# Patient Record
(demographics unavailable — no encounter records)

---

## 2024-10-14 NOTE — PHYSICAL EXAM
[No Nipple Discharge] : no nipple discharge [de-identified] : cstic changes in lower outer quad, rest dense [de-identified] : weak cores [FreeTextEntry1] : defer [de-identified] : from in knee, neg draw, [place], no sweling. slight crepitus

## 2024-10-14 NOTE — HISTORY OF PRESENT ILLNESS
[de-identified] : 66 yo F, retired  with hypothyroidism, osteopenia, ADHD, LRRH2 mutation, here for her second AWV with me after her PCP left practice in 2022..  Presently  with her wife x 11 years. Had twins-boy and girl which she gave birth after insemination when she was with her first female partner who  over 20 year ago when she gave birth and raised her children by herself Following concerns: _ Needs flu vaccine UTD with mammo and DEXA-osteopenia -Covid infection in May 2023-took Paxlovid. Vaccines-FLU and Covid booster this past  Shingrix vaccine  x2 in  -Hypothyroid -diagnosed in , levothyroxine dose adjusted Oct 2021 when TSH 6.6, on levothyroxine 112mcgOD and on  day, 100mcg added added for total 212mcg.  (+ thyroid AB).  Labs reviewed with her and increase Free T4 noted since additional levothyroxine 100mcg added once/week with TSH in lower range of normal-Suggest stopping additional dose today and f/u labs in 6 weeks. Seen by endo Dr Meyer for  elevated TPO  and thyroglubulin Ab-note in chart .FH thyroid conditions in family Hx LRRH2 mutation-followed in study as father has Parkinson and to assess her own risk -Osteopenia-takes no Vit D or MVI or calcium. Last dexas recorded in chart  with osteopenia noted and in .  Aware to start Vit D 7287-4385 iu daily, as not on any Vit D, and has been more sedentary in past months and not doing any significant weight bering exercises. . Reviewed significance of osteopenia and therapy if progression to osteoporosis with use biphophonates. FH osteoporosis in mom. Takes no extra vitamins, no Vit D or calcium. More sedentary in past few monhts with weight gain and higher caloric foods. Also hypercalcemia noted in  and will obtain PTH level, Vit D levels, -chronic knee pain/pes equinus-wears custom orthotics-reports discomfort in her knees for several years, attributes to biking in past..  Did see ortho Dr Maxwell Oct 2016 but pain back again and needs f/u with ortho and orthotist as not using her orthotics now. -chronic LBP-admits to being more sedentary after [facility] 5 years ago as . Admits to weak cores, and not exercising recently, weight variable, made aware high arches contributing to symptoms-wants referal to see an orthopdist and get new orthoics -Hx ADHD-prn Vyvance-refill by outside provider and to continue same  Gyn UTD Mammogram UTD [facility] 2021 _

## 2024-10-14 NOTE — PHYSICAL EXAM
[No Nipple Discharge] : no nipple discharge [de-identified] : cstic changes in lower outer quad, rest dense [de-identified] : weak cores [FreeTextEntry1] : defer [de-identified] : from in knee, neg draw, [place], no sweling. slight crepitus

## 2024-10-14 NOTE — HISTORY OF PRESENT ILLNESS
[de-identified] : 68 yo F, retired  with hypothyroidism, osteopenia, ADHD, LRRH2 mutation, here for her second AWV with me after her PCP left practice in 2022..  Presently  with her wife x 11 years. Had twins-boy and girl which she gave birth after insemination when she was with her first female partner who  over 20 year ago when she gave birth and raised her children by herself Following concerns: _ Needs flu vaccine UTD with mammo and DEXA-osteopenia -Covid infection in May 2023-took Paxlovid. Vaccines-FLU and Covid booster this past  Shingrix vaccine  x2 in  -Hypothyroid -diagnosed in , levothyroxine dose adjusted Oct 2021 when TSH 6.6, on levothyroxine 112mcgOD and on  day, 100mcg added added for total 212mcg.  (+ thyroid AB).  Labs reviewed with her and increase Free T4 noted since additional levothyroxine 100mcg added once/week with TSH in lower range of normal-Suggest stopping additional dose today and f/u labs in 6 weeks. Seen by endo Dr Meyer for  elevated TPO  and thyroglubulin Ab-note in chart .FH thyroid conditions in family Hx LRRH2 mutation-followed in study as father has Parkinson and to assess her own risk -Osteopenia-takes no Vit D or MVI or calcium. Last dexas recorded in chart  with osteopenia noted and in .  Aware to start Vit D 0039-5738 iu daily, as not on any Vit D, and has been more sedentary in past months and not doing any significant weight bering exercises. . Reviewed significance of osteopenia and therapy if progression to osteoporosis with use biphophonates. FH osteoporosis in mom. Takes no extra vitamins, no Vit D or calcium. More sedentary in past few monhts with weight gain and higher caloric foods. Also hypercalcemia noted in  and will obtain PTH level, Vit D levels, -chronic knee pain/pes equinus-wears custom orthotics-reports discomfort in her knees for several years, attributes to biking in past..  Did see ortho Dr Maxwell Oct 2016 but pain back again and needs f/u with ortho and orthotist as not using her orthotics now. -chronic LBP-admits to being more sedentary after senior care 5 years ago as . Admits to weak cores, and not exercising recently, weight variable, made aware high arches contributing to symptoms-wants referal to see an orthopdist and get new orthoics -Hx ADHD-prn Vyvance-refill by outside provider and to continue same  Gyn UTD Mammogram UTD [place] 2021 _

## 2024-10-14 NOTE — HEALTH RISK ASSESSMENT
[Time Spent: ___ Minutes] : I spent [unfilled] minutes performing a depression screening for this patient. [de-identified] : 3 calderon/week [Audit-CScore] : 3 [[id number]] : 0

## 2024-10-14 NOTE — HEALTH RISK ASSESSMENT
[Time Spent: ___ Minutes] : I spent [unfilled] minutes performing a depression screening for this patient. [de-identified] : 3 calderon/week [Audit-CScore] : 3 [[id number]] : 0

## 2024-10-23 NOTE — HISTORY OF PRESENT ILLNESS
[de-identified] : 66 yo F with hearing loss AU for over 7 years now with clogged sensation AD since recent flight three weeks ago presents for evaluation. Developed clogged sensation AD after flying about three weeks ago - tried decongestant - slight improvement but returned. Now with muffled hearing AD. Hearing aids for past 5 years - doesnt wear consistently - helpful but old - has appointment with dispensary in Dec to get new ones. No tinnitus, otalgia, otorrhea, ear infections, dizziness/vertigo or headaches related to hearing loss. Hx of head trauma 10 years ago and another time as a child. No hx of exposure to loud noises. Mother with hearing loss in her 70s. Last audio over a year ago. Has hx of [place] Palsy 5 years ago left side - had MRI then - and memory issues - MRI 2022 ([facility]) -

## 2024-10-23 NOTE — DATA REVIEWED
[de-identified] : Mild to moderate SNHL AD. Hearing is -1kHz, mild to moderately severe SNHL thereafter AS. Type A (normal) tympanograms AU. ETD: negative AS, could not maintain seal AD.

## 2024-10-23 NOTE — HISTORY OF PRESENT ILLNESS
[de-identified] : 66 yo F with hearing loss AU for over 7 years now with clogged sensation AD since recent flight three weeks ago presents for evaluation. Developed clogged sensation AD after flying about three weeks ago - tried decongestant - slight improvement but returned. Now with muffled hearing AD. Hearing aids for past 5 years - doesnt wear consistently - helpful but old - has appointment with dispensary in Dec to get new ones. No tinnitus, otalgia, otorrhea, ear infections, dizziness/vertigo or headaches related to hearing loss. Hx of head trauma 10 years ago and another time as a child. No hx of exposure to loud noises. Mother with hearing loss in her 70s. Last audio over a year ago. Has hx of [place] Palsy 5 years ago left side - had MRI then - and memory issues - MRI 2022 ([facility]) -

## 2024-10-23 NOTE — DATA REVIEWED
[de-identified] : Mild to moderate SNHL AD. Hearing is -1kHz, mild to moderately severe SNHL thereafter AS. Type A (normal) tympanograms AU. ETD: negative AS, could not maintain seal AD.

## 2024-11-16 NOTE — HISTORY OF PRESENT ILLNESS
[FreeTextEntry1] : oab sx better/ self d/c'd the oxybutinin due to concerns re memory [Urge Incontinence Of Urine] : moderate [Urinary Frequency] : moderate [Feelings Of Urinary Urgency] : moderate [x1] : once nightly [Urinary Tract Infection] : mild [Cystocele (Obstetric)] : bladder prolapse [Uterine Prolapse] : uterine prolapse [Vaginal Wall Prolapse] : vaginal prolapse [Rectal Prolapse] : rectal prolapse [Constipation Obstructed Defecation] : constipation [Incomplete Emptying Of Stool] : incomplete defecation [Stool Visible Blood] : blood in the stool [Diarrhea] : diarrhea [Stress Incontinence] : stress incontinence [Unable To Restrain Bowel Movement] : fecal incontinence [Hematuria] : hematuria [Pain During Urination (Dysuria)] : dysuria [Incomplete Emptying Of Bladder] : incomplete empyting of bladder [Urinary Stream Starts And Stops] : intermittent urine stream [Pelvic Pain] : pelvic pain [Vaginal Pain] : vaginal pain [Vulvar Pain] : vulva pain [Bladder Pain] : bladder pain [Rectal Pain] : rectal pain [Back Pain] : flank/back pain [Sexual Dysfunction, NOS] : sexual arousal dysfunction [] : sexual climax dysfunction

## 2024-11-16 NOTE — PHYSICAL EXAM
[Chaperone Present] : A chaperone was present in the examining room during all aspects of the physical examination [No Acute Distress] : in no acute distress [Well developed] : well developed [Well Nourished] : ~L well nourished [Good Hygeine] : demonstrates good hygeine [Oriented x3] : oriented to person, place, and time [Normal Memory] : ~T memory was ~L unimpaired [Normal Lung Sounds] : the lungs were clear to auscultation [Normal Mood/Affect] : mood and affect are normal [Respirations regular] : ~T respiratory rate was regular [Rate & Rhythm Regular] : ~T heart rate and rhythm were normal [No Edema] : ~T edema was not present [Supple] : ~T the neck demonstrated no ~M decrease in suppleness [Thyroid Normal] : the thyroid ~T showed no abnormalities [Symmetrical] : the neck was ~L symmetrical [Mass] : no breast mass [Tender] : no tenderness [Nipple Discharge] : no nipple discharge [Mass (___ Cm)] : no ~M [unfilled] abdominal mass was palpated [Tenderness] : ~T no ~M abdominal tenderness observed [H/Smegaly] : no hepatosplenomegaly [Supraclavicular LAD] : no adenopathy noted in supraclavicular lymph nodes [Axillary LAD] : no adenopathy was noted in axillary nodes [Inguinal LAD] : no adenopathy was noted in the inguinal lymph nodes [Warm and Dry] : was warm and dry to touch [Turgor Normal] : skin turgor ~T was normal [Rash/Lesion] : no rash or lesion was noted [Vulvar Atrophy] : vulvar atrophy [Labia Majora] : were normal [Labia Minora] : were normal [Bartholin's Gland] : both Bartholin's glands were normal  [Normal Appearance] : general appearance was normal [Estrogen Effect] : no estrogen effect was observed [4] : 4 [] : 0 [Uterine Adnexae] : were not tender and not enlarged [Normal rectal exam] : was normal [Normal] : was normal [None] : no

## 2024-11-16 NOTE — REVIEW OF SYSTEMS
[Eyesight Problems] : eyesight problems [Easy Bruising] : a tendency for easy bruising [Loss Of Hearing] : hearing loss [All Other ROS] : all other reviewed systems are negative [FreeTextEntry1] : neuro/memory being monitored

## 2024-11-16 NOTE — COUNSELING
[FreeTextEntry1] : Update December 21, 2023  #1 mammogram/sono normal on rpt views 10/18 rpt 10/19 she had a BI-RADS 3 in December saw Dr. Wong no masses noted on exam or abnormalities today or with Dr. Wong and she will have a repeat mammo-sono in June prescription was given for diagnostic mammo-and sono normal ck 2021: MISSED JUNE MAMMO/SONO! ORDERED ASAP AND THEN TO SEE DR WONG-NO MASSES FELT ON EXAM HOWEVER THERE IS ON SONO/MAMMO STABLE CYST/NODULE 10MM AT 2-3O'CLOCK 4-5 CM FROM AREOLA-BARELY APPRECIATED- LEFT BREAST! discharged from Dr. Wong this year for now, recent m/s stable-normal exam 4/22/22; UPDATE 12/2/22-normal exam today-referred for mammo/sonogram (ck br cyst)-update-12/22-normal examination-normal mammo sono 2023- she will follow-up with Dr. Wong as clinically indicated and have repeat mammo sono done in February 2024 or sooner as clinically indicated-normal breast exam today  #2 NEURO WORKUP IN PROGRESS/REF TO DR TIJERINA AT PT REQ TO RE-CK THYOID 'NOT RIGHT YET' 4/22/22-janna wong neuro and [facility]ma psych ref by dr wong-all testing per pt completed and wnl. Pt not on her Vyvanze at this point-'not helping ex a little bit' if she needs to take this she will re-consult psych dr Freire. Thyroid wnl at this point-stable.(mild thyroiditis sono 2023)  #3 bone density Basically stable osteopenia-will increase vitamin D, and already exercises quite a bit-repeat 2 years-thyroid is currently under #normal-sees pcp for this-Dr. GUDINO-2023 slight> osteopenia hip, spine relatively stable-rpt 2025  #4 Pelvic ultrasound ordered transabdominal and transvaginal normal ex ovarian cyst slightly smaller to stable-simple with new or previously not visualized 8 mmfibroid in uterus, no symptoms of bleeding, pain, etc NORMAL STABLE 4MM AND 2X 3 CM SIMPLE CYST-2018; rpt as clinically indicated 6-12 mos-stable cyst simple cyst today unilocular however patient has some middle of gastric heaviness and although I do not feel anything on exam I am ordering a transabdominal pelvic and abdominal ultrasound to make sure that there is no intra-abdominal or upper pelvic lesion.  Her pelvic exam is completely normal with a stable cyst which is now slightly smaller than the last ultrasound at 26 mm it unilocular endometrial lining is normal this will be repeated in 1 year or sooner as clinically indicated as confirmed by pelvic exam today which is entirely normal-rpt 10/8/21-NORMAL EL AND STABLE UNILOCULAR CYST RPT 2022; APRIL 22 2022 TO CK OAB AND BREAST AND ANNUAL OCT WITH SONO/PAP ETC; ____________; pap/hpv 4/22/22, sono el 3 x 2 mm polyp-no vascularity, no bleeding, however 3.5 cm rt unilocular benign cyst sl > than last sono oct 2021 2.7cm-rpt 3-6 mos here and sooner is sx or bleeding-ssx ov cancer given to pt; UPDATE 12/2/22-polyp resolved and RO cyst now smaller 3.1 cm from 3.4 cm and is SIMPLE-rpt 12/23-ovaries NORMAL-NO CYSTS, el nl < 3mm ?avascular 5 mm polyp-NO stain/bleeding at all, repeat 2024 or as clinically indicated  #5 Pap and HPV nl  2020 and neg affirm at lv; no discharge today RPT 2022 OR AS CLINICALLY INDICATED; 4/22/22: pap/hpv repeated, affirm (no need for affirm/no discharge or sx); pap and hpv repeated NORMAL; UPDATE rpt as per asccp guidelines-due 2024 pap/hpv-nl exam NO discharge today  #6 slight hearing loss she will have this reevaluated next year and possibly have some bilateral small hearing aides placed this is in all likelihood a high-frequency loss consistent with age-Her mother ALT also has mild age-related high-frequency loss-Going for hearing aids due to high-frequency  hearing loss-has them and they are out for repair-ref to [facility] speech and hearing   #7 Pelvic ultrasound  12/18/24 w/visit annual 12/2024  #8 Patient will see me in 12 mos appt made or sooner as clinically indicated  #9 Patient was adequately treated for bacterial vaginosis And test of cure was negative now no discharge evident as above-affirm sent lv neg and no evidence of this today  #10 Colonoscopy is up to date 11/21 no polyps neg rpt 3 years per pt will ck w/GI MD due 2024   #11 Methanamine rxd 1 g following intercourse or sexual activity to ameliorate UTIs associated with this activity-no uti so is not taking this currently; cran prn w/sexualy activity  #12  questions answered to the patient's apparent satisfaction new lit provided  #13 slight uui if waits too long-timed voiding suggested-PTNS brochure given and acupressure points shown to pt -always has had oab since age 6 per pt-declines rx at this point but advised if > to lmk and we will try and rx  #14 ua uc cytol leslie was negative_ urinalysis culture and cytology sent today   #15  Patient concerned about vaginal and vulvar area no discharge seen today-whatsoever and the tiny area that she is concerned about is consistent with a very tiny less than 1 mm vulvar follicle with no evidence of induration erythema or infection patient was advised to continue vulvar hygiene with use of probiotic as needed: will take as needed-less of a concern today 12/21/23  #16 memory loss currently under investigation (see note from neurologist)-resolved all tests 'age appropriate'-aware and will follow humza

## 2024-11-16 NOTE — CHIEF COMPLAINT
[Questionnaire Received] : Patient questionnaire received [Urine Frequency] : urine frequency [Other ___] : [unfilled] [Poor Bladder Control] : poor bladder control

## 2024-11-18 NOTE — HISTORY OF PRESENT ILLNESS
[FreeTextEntry1] : Ms. JANE WEILER is a 68 year  old female  here for evaluation of hypothyroidism Diagnosed in her 20s.  She has been stable on thyroid medications 112mcg once daily.  For one day of the week, she takes 222mcg once daily.   She reported significant weight gain over the last few months. She endorsed maybe exercising less; she didn't noticed a significant change in her diet.  She did endorse decreased exercise.   Ms. WEILER  reports: There is not signficiant fatigue There was trouble losing weight There was no cold intolerance There  was no constipation She report some trouble falling asleep.   There is mild depression.  There is strong family history of thyroid issues, mother and sister with hypothyroidism.  She takes a cup of coffee and then take her thyroid medication.  She wait at least 30 minutes to one hour prior.   There is not a personal history of radiation exposure There was no exposure to amiodarone, recent iodine containing contrast  There is not exogenous thyroid intake, nutritional supplements.   Patient had a history of LRRK2 mutation, currently being FDL for cognitive and longitudinal testing.  She also reported a history of ADD since childhood.  Patient had a history of ADHD in the past, she was treated with Vyvanse in the past but currently not taking anything.  Patient is retired  at [employer].  She did mostly outpatient work. Patient establish care with Dr. Pal secondary to memory loss.  Patient is referred back to me by her primary care doctor regarding elevated calcium level and high parathyroid hormone level. Patient denies any family history of FHH. Patient reports no prior history of kidney stone. Patient has osteopenia as evident on most recent bone density in October 2023.

## 2024-11-18 NOTE — ASSESSMENT
[FreeTextEntry1] : 60-year-old woman here to follow-up for hypothyroidism and hypoparathyroidism, likely primary.   1.  Hashimoto thyroiditis, hypothyroidism Patient is currently on levothyroxine treatment, 112 mcg 6 days a week and 212 mcg on Sunday.   She is clinically euthyroid. She is biochemically euthyroid with a normal TSH, free T3, T3 total level.  Discussed with patient that Hashimoto encephalopathy is a syndrome of acute or subacute encephalopathy that is associated with elevated antithyroid antibody titers.  Currently I do not suspect that patient's presentation is consistent with encephalopathy.  I discussed with patient that the entity of Hashimoto encephalopathy may be disputed, given the autoimmune nature of the disease, high-dose of glucocorticoid have been used in the past and patient was suspected cases in hope of lowering the inflammatory process.  This is sometimes used when other causes of encephalopathy is ruled out.  2.  Hypercalcemia The physiology of calcium homeostasis was reviewed with the patient including regulation of calcium by parathyroid hormone and mechanisms by which PTH acts. In addition we discussed potential causes of hypercalcemia including primary hyperparathyroidism.Check 24 hour urine calcium, creatinine, and volume. Instructions for proper 24 hour urine collection were reviewed.  Check 24 hour urine excretion  Serum Vitamin D 25OH, Vitamin D 1,25(OH)Vitamin D Bone density was done Oct 2024-Kidney ultrasound to assess for undiagnosed (subclinical) nephrocalcinosis or calcium kidney stone.   Discussed indication for surgery in asymptomatic PHPT 2013: (Serum calcium 1.0mg/dl above the upper limit of normal, BMD by DXA: T-score <-2.5 at lumbar spine, total hip, femoral neck, or distal 1/3 radius, vertebral fracture by radiograph, CT, MRI, or VFA, CrCl <60mL/min, 24 hour urine >400 mg/dl and increase stone risk by biochemical stone risk analysis), presence of nephrolithiasis or nephrocalcinosis by radiograph, ultrasound, or CT) Patient is very interested in proceeding with surgery for definitive cure of hyperparathyroidism.

## 2024-12-04 NOTE — REASON FOR VISIT
[Home] : at home, [unfilled] , at the time of the visit. [Medical Office: ([facility])___] : at the medical office located in  [Spouse] : spouse [Patient] : the patient

## 2024-12-04 NOTE — HISTORY OF PRESENT ILLNESS
[FreeTextEntry1] : Ms. JANE WEILER is a 68 year  old female  here for evaluation of hypothyroidism Diagnosed in her 20s.  She has been stable on thyroid medications 112mcg once daily.  For one day of the week, she takes 222mcg once daily.   She reported significant weight gain over the last few months. She endorsed maybe exercising less; she didn't notice a significant change in her diet.  She did endorse decreased exercise.  Patient had a history of LRRK2 mutation, currently being FDL for cognitive and longitudinal testing.  She also reported a history of ADD since childhood.  Patient had a history of ADHD in the past, she was treated with Vyvanse in the past but currently not taking anything.  Patient is retired  at [employer].  She did mostly outpatient work. Patient establish care with Dr. Pal secondary to memory loss.  Patient is referred back to me by her primary care doctor regarding elevated calcium level and high parathyroid hormone level. Patient denies any family history of FHH. Patient reports no prior history of kidney stone. Patient has osteopenia as evident on most recent bone density in October 2023. She was interested in proceeding with parathyroidectomy.

## 2024-12-04 NOTE — ASSESSMENT
[FreeTextEntry1] : 60-year-old woman here to follow-up for hypothyroidism and hypoparathyroidism, likely primary.   1.  Hashimoto thyroiditis, hypothyroidism Patient is currently on levothyroxine treatment, 112 mcg 6 days a week and 212 mcg on Sunday.   She is clinically euthyroid. She is biochemically euthyroid with a normal TSH, free T3, T3 total level.  2.  Hypercalcemia Discussed indication for surgery in asymptomatic PHPT 2013: (Serum calcium 1.0mg/dl above the upper limit of normal, BMD by DXA: T-score <-2.5 at lumbar spine, total hip, femoral neck, or distal 1/3 radius, vertebral fracture by radiograph, CT, MRI, or VFA, CrCl <60mL/min, 24 hour urine >400 mg/dl and increase stone risk by biochemical stone risk analysis), presence of nephrolithiasis or nephrocalcinosis by radiograph, ultrasound, or CT) Patient is very interested in proceeding with surgery for definitive cure of hyperparathyroidism.   Patient meeting criteria as 24 hour urine calcium >400 mg/dl Patient with worsening osteopenia.  US kidney and bladder done and are within normal limits Patient scheduled for Head and Neck Surgery tomorrow.    Will follow up with me 3 months after surgery around April 2025.

## 2024-12-06 NOTE — HISTORY OF PRESENT ILLNESS
[de-identified] : Pt presents for evaluation of elevated calcium and fatigue. denies bone pain, constipation or kidney stones, dysphagia, hoarseness, SOB or RT exposure.  Ca 10.8, PTH 72 Vitamin D 96.6, 24 Hr Urine Ca 432, normal TFTs sonogram: no thyroid nodules or parathyroids identified bone density: osteopenia I have reviewed all old and new data and available images.  Additional information was obtained from others present at the time of visit to ensure the completeness of the history

## 2024-12-06 NOTE — ASSESSMENT
[FreeTextEntry1] : lengthy discussion regarding options for management. in view of labs and symptoms have recommended minimally invasive parathyroidectomy with PTH assay.  will require preop 4D CT.  risks, benefits and alternatives discussed at length.  I have discussed with the patient the anatomy of the area, the pathophysiology of the disease process and the rationale for surgery.  The attendant risks, possible complications and expected postoperative course have been discussed in detail.  I have given the patient the opportunity to ask questions, and all questions have been answered to the patient's satisfaction, and they wish to proceed with the planned procedure. to be scheduled ambulatory at [facility]. to call next week for CT results.

## 2024-12-06 NOTE — CONSULT LETTER
[Dear  ___] : Dear  [unfilled], [Consult Letter:] : I had the pleasure of evaluating your patient, [unfilled]. [Please see my note below.] : Please see my note below. [Consult Closing:] : Thank you very much for allowing me to participate in the care of this patient.  If you have any questions, please do not hesitate to contact me. [Sincerely,] : Sincerely, [FreeTextEntry2] : Dr. Yaniv Meyer, Dr. Erika Lange [FreeTextEntry3] : Sterling Alvarado MD, FACS System Director, Endocrine Surgery [facility] Associate  Professor of Surgery [employer] [facility] [employer] at [facility] [DrKyle  ___] : Dr. CUELLO

## 2024-12-06 NOTE — PHYSICAL EXAM
[de-identified] : no palpable thyroid nodules [Laryngoscopy Performed] : laryngoscopy was performed, see procedure section for findings [Midline] : located in midline position [Normal] : orientation to person, place, and time: normal [de-identified] : indirect  laryngoscopy shows normal vocal cord mobility bilaterally with no lesions noted

## 2024-12-06 NOTE — ASSESSMENT
[FreeTextEntry1] : lengthy discussion regarding options for management. in view of labs and symptoms have recommended minimally invasive parathyroidectomy with PTH assay.  will require preop 4D CT.  risks, benefits and alternatives discussed at length.  I have discussed with the patient the anatomy of the area, the pathophysiology of the disease process and the rationale for surgery.  The attendant risks, possible complications and expected postoperative course have been discussed in detail.  I have given the patient the opportunity to ask questions, and all questions have been answered to the patient's satisfaction, and they wish to proceed with the planned procedure. to be scheduled ambulatory at [facility]. to call next week for CT results.

## 2024-12-06 NOTE — CONSULT LETTER
[Dear  ___] : Dear  [unfilled], [Consult Letter:] : I had the pleasure of evaluating your patient, [unfilled]. [Please see my note below.] : Please see my note below. [Consult Closing:] : Thank you very much for allowing me to participate in the care of this patient.  If you have any questions, please do not hesitate to contact me. [Sincerely,] : Sincerely, [FreeTextEntry2] : Dr. Yaniv Meyer, Dr. Erika Lange [FreeTextEntry3] : Sterling Alvarado MD, FACS System Director, Endocrine Surgery [facility] Associate  Professor of Surgery [facility] [employer] at [facility] [DrKyle  ___] : Dr. CUELLO

## 2024-12-06 NOTE — HISTORY OF PRESENT ILLNESS
[de-identified] : Pt presents for evaluation of elevated calcium and fatigue. denies bone pain, constipation or kidney stones, dysphagia, hoarseness, SOB or RT exposure.  Ca 10.8, PTH 72 Vitamin D 96.6, 24 Hr Urine Ca 432, normal TFTs sonogram: no thyroid nodules or parathyroids identified bone density: osteopenia I have reviewed all old and new data and available images.  Additional information was obtained from others present at the time of visit to ensure the completeness of the history

## 2024-12-06 NOTE — PHYSICAL EXAM
[de-identified] : no palpable thyroid nodules [Laryngoscopy Performed] : laryngoscopy was performed, see procedure section for findings [Midline] : located in midline position [Normal] : orientation to person, place, and time: normal [de-identified] : indirect  laryngoscopy shows normal vocal cord mobility bilaterally with no lesions noted

## 2024-12-18 NOTE — COUNSELING
[FreeTextEntry1] : Update December 18, 2024 NL BREAST AND PELVIC EXAM TODAY #1 mammogram/sono normal on rpt views 10/18 rpt 10/19 she had a BI-RADS 3 in December saw Dr. Wong no masses noted on exam or abnormalities today or with Dr. Wong and she will have a repeat mammo-sono in June prescription was given for diagnostic mammo-and sono normal ck 2021: MISSED JUNE MAMMO/SONO! ORDERED ASAP AND THEN TO SEE DR WONG-NO MASSES FELT ON EXAM HOWEVER THERE IS ON SONO/MAMMO STABLE CYST/NODULE 10MM AT 2-3O'CLOCK 4-5 CM FROM AREOLA-BARELY APPRECIATED- LEFT BREAST! discharged from Dr. Wong this year for now, recent m/s stable-normal exam 4/22/22; UPDATE 12/2/22-normal exam today-referred for mammo/sonogram (ck br cyst)-update-12/22-normal examination-normal mammo sono 2023- she will follow-up with Dr. Wong as clinically indicated and have repeat mammo sono done in February 2024 or sooner as clinically indicated-normal breast exam today-NL MAMMO APRIL 2024, RPT 4/25 AND BR SONO 1/25  #2 NEURO WORKUP IN PROGRESS/REF TO DR TIJERINA AT PT REQ TO RE-CK THYOID 'NOT RIGHT YET' 4/22/22-[facility] w[facility] neuro and salma psych ref by dr wong-all testing per pt completed and wnl. Pt not on her Vyvanze at this point-'not helping ex a little bit' if she needs to take this she will re-consult psych dr Freire. Thyroid wnl at this point-stable.(mild thyroiditis sono 2023)  #3 bone density Basically stable osteopenia-will increase vitamin D, and already exercises quite a bit-repeat 2 years-thyroid is currently under #normal-sees pcp for this-Dr. GUDINO-2023 slight> osteopenia hip, spine relatively stable-rpt 2025  #4 Pelvic ultrasound ordered transabdominal and transvaginal normal ex ovarian cyst ro stable and tiny cyst lo stable el stable tiny avacular polyp no change 2023 repeat 2025 or as clinically indicated  #5 Pap and HPV rpt sent today  #6 slight hearing loss she will have this reevaluated next year and possibly have some bilateral small hearing aides placed this is in all likelihood a high-frequency loss consistent with age-Her mother ALT also has mild age-related high-frequency loss-Going for hearing aids due to high-frequency  hearing loss-has them and they are out for repair-ref to [facility] speech and hearing   #7 ua uc sent pvr nl  #8 Patient will see me in 12 mos appt made or sooner as clinically indicated  #9 Patient was adequately treated for bacterial vaginosis And test of cure was negative now no discharge evident as above-affirm sent lv neg and no evidence of this today  #10 Colonoscopy nl fall 2024  #11 Methanamine rxd 1 g following intercourse or sexual activity to ameliorate UTIs associated with this activity-no uti so is not taking this currently; cran prn w/sexual activity  #12  questions answered to the patient's apparent satisfaction new lit provided  #13 slight uui if waits too long-timed voiding suggested-PTNS brochure given and acupressure points shown to pt -always has had oab since age 6 per pt-declines rx at this point but advised if > to lmk and we will try and rx  #14 ua uc cytol leslie was negative_ urinalysis culture sent today   #15  Patient concerned about vaginal and vulvar area no discharge seen today-whatsoever and the tiny area that she is concerned about is consistent with a very tiny less than 1 mm vulvar follicle with no evidence of induration erythema or infection patient was advised to continue vulvar hygiene with use of probiotic as needed: will take as needed-less of a concern today   #16 memory loss currently under investigation (see note from neurologist)-resolved all tests 'age appropriate'-aware and will follow humza

## 2024-12-18 NOTE — COUNSELING
[FreeTextEntry1] : Update December 18, 2024 NL BREAST AND PELVIC EXAM TODAY #1 mammogram/sono normal on rpt views 10/18 rpt 10/19 she had a BI-RADS 3 in December saw Dr. Wong no masses noted on exam or abnormalities today or with Dr. Wong and she will have a repeat mammo-sono in June prescription was given for diagnostic mammo-and sono normal ck 2021: MISSED JUNE MAMMO/SONO! ORDERED ASAP AND THEN TO SEE DR WONG-NO MASSES FELT ON EXAM HOWEVER THERE IS ON SONO/MAMMO STABLE CYST/NODULE 10MM AT 2-3O'CLOCK 4-5 CM FROM AREOLA-BARELY APPRECIATED- LEFT BREAST! discharged from Dr. Wong this year for now, recent m/s stable-normal exam 4/22/22; UPDATE 12/2/22-normal exam today-referred for mammo/sonogram (ck br cyst)-update-12/22-normal examination-normal mammo sono 2023- she will follow-up with Dr. Wong as clinically indicated and have repeat mammo sono done in February 2024 or sooner as clinically indicated-normal breast exam today-NL MAMMO APRIL 2024, RPT 4/25 AND BR SONO 1/25  #2 NEURO WORKUP IN PROGRESS/REF TO DR TIJERINA AT PT REQ TO RE-CK THYOID 'NOT RIGHT YET' 4/22/22-[facility] neuro and salma psych ref by dr wong-all testing per pt completed and wnl. Pt not on her Vyvanze at this point-'not helping ex a little bit' if she needs to take this she will re-consult psych dr Freire. Thyroid wnl at this point-stable.(mild thyroiditis sono 2023)  #3 bone density Basically stable osteopenia-will increase vitamin D, and already exercises quite a bit-repeat 2 years-thyroid is currently under #normal-sees pcp for this-Dr. GUDINO-2023 slight> osteopenia hip, spine relatively stable-rpt 2025  #4 Pelvic ultrasound ordered transabdominal and transvaginal normal ex ovarian cyst ro stable and tiny cyst lo stable el stable tiny avacular polyp no change 2023 repeat 2025 or as clinically indicated  #5 Pap and HPV rpt sent today  #6 slight hearing loss she will have this reevaluated next year and possibly have some bilateral small hearing aides placed this is in all likelihood a high-frequency loss consistent with age-Her mother ALT also has mild age-related high-frequency loss-Going for hearing aids due to high-frequency  hearing loss-has them and they are out for repair-ref to [facility] speech and hearing   #7 ua uc sent pvr nl  #8 Patient will see me in 12 mos appt made or sooner as clinically indicated  #9 Patient was adequately treated for bacterial vaginosis And test of cure was negative now no discharge evident as above-affirm sent lv neg and no evidence of this today  #10 Colonoscopy nl fall 2024  #11 Methanamine rxd 1 g following intercourse or sexual activity to ameliorate UTIs associated with this activity-no uti so is not taking this currently; cran prn w/sexual activity  #12  questions answered to the patient's apparent satisfaction new lit provided  #13 slight uui if waits too long-timed voiding suggested-PTNS brochure given and acupressure points shown to pt -always has had oab since age 6 per pt-declines rx at this point but advised if > to lmk and we will try and rx  #14 ua uc cytol leslie was negative_ urinalysis culture sent today   #15  Patient concerned about vaginal and vulvar area no discharge seen today-whatsoever and the tiny area that she is concerned about is consistent with a very tiny less than 1 mm vulvar follicle with no evidence of induration erythema or infection patient was advised to continue vulvar hygiene with use of probiotic as needed: will take as needed-less of a concern today   #16 memory loss currently under investigation (see note from neurologist)-resolved all tests 'age appropriate'-aware and will follow humza

## 2024-12-18 NOTE — PHYSICAL EXAM
[89417] : A chaperone was present during the pelvic exam. [FreeTextEntry2] : Disha [Mass] : no breast mass [Tender] : no tenderness [Nipple Discharge] : no nipple discharge [Mass (___ Cm)] : no ~M [unfilled] abdominal mass was palpated [Tenderness] : ~T no ~M abdominal tenderness observed [H/Smegaly] : no hepatosplenomegaly [Supraclavicular LAD] : no adenopathy noted in supraclavicular lymph nodes [Axillary LAD] : no adenopathy was noted in axillary nodes [Inguinal LAD] : no adenopathy was noted in the inguinal lymph nodes [Rash/Lesion] : no rash or lesion was noted [Estrogen Effect] : no estrogen effect was observed

## 2024-12-19 NOTE — PROCEDURE
[] : Acoustic Immittance: [226 Hz] : 226 Hz [Normal Eardrum Mobility] : consistent with normal eardrum mobility [Type A Tympanogram] : Type A Normal [] : Complete Audiological Evaluation [Good] : good [Insert Ear Phones] : insert ear phones [de-identified] : Excellent speech recognition scores obtained, bilaterally.  [de-identified] : Essentially mild sensorineural hearing loss from 250Hz-3kHz to a moderate to moderately severe sensorineural hearing loss from 4kHz-8kHz.  [de-identified] : Hearing within normal limits from 250Hz-1kHz to a mild to moderate sensorineural hearing loss from 2kHz-8kHz.

## 2024-12-19 NOTE — PLAN
[FreeTextEntry2] : 1. HAE in the dispensary today, as scheduled.  2. Continued use of binaural amplification. 3. Annual audiological evaluation or sooner if medically indicated and/or if change in hearing noted. 4. Continued ENT management.

## 2024-12-19 NOTE — HISTORY OF PRESENT ILLNESS
[FreeTextEntry1] : Patient is a 68-year-old female seen today for a follow up audiological evaluation. Tinnitus, vertigo, otalgia, aural pressure and history of ear infections were all denied. Family history of hearing loss and history of noise exposure were also denied.  [FreeTextEntry8] : Patient previously seen by Dr. Cook on 10/23/24 for Eustachian tube dysfunction at which time an audiological evaluation was conducted. Results of that evaluation revealed hearing within normal limits from 250Hz-1kHz to a mild to moderately severe SNHL in the left ear and a mild to moderate SNHL from 250Hz-8kHz in the right ear with excellent SRS, bilaterally. Patient reported today that the ETD has since resolved.

## 2024-12-19 NOTE — ASSESSMENT
[FreeTextEntry1] : Reviewed results and recommendations with patient who expressed her understanding.

## 2025-01-15 NOTE — ASSESSMENT
[FreeTextEntry1] : Aided Ears: Binaural Hearing Aid: [employer]on Intent 1s Right Serial Number: [id number] Serial Number: [id number] Receivers: Right: 1R85 Left 1L85 Molds: AD: [id number] AS [id number] below note : SN [id number] (repair warranty only: 2/2/2028) Accessories: Smart  SN [id number] (repair warranty only: 2/2/2028) Repair warranty: 2/2/2028 Loss and Damage Warranty: 2/2/2028  45 day trial: 3/1/2025  Connected to software and programmed hearing aid. Patient set to ACC 2. Patient reported the volume to be comfortable and sound to be clear.  Molds received are corda molds however, they fit in her ears well with no complaints of sound quality.  Called [employer] because this is not what I ordered.  However, order form for molds was filled out on pediatric order in error for micromolds under corda tube.  ERROR  should've been micromolds for intent aids.  Will call [facility] and [facility] to discuss either rfc or obtain micromolds at [place]. These molds are under remake until 4/21/25 should the patient keep them.   Reviewed usage of hearing aid and chargers as well as maintenance and care of the hearing aid. Practiced proper insertion and removal techniques with patient. Patient demonstrated that she was able to properly insert and remove hearing aid, independently. Paired hearing aid to patient's Android phone. Downloaded the [employer]  live and reviewed usage. Reviewed warranties and 45-day trial period.   Patient is interested in connect clip to use with laptop and as a remote bill. Will demo connect clip at upcoming [facility] appt.     Patient happy with today's services.

## 2025-01-15 NOTE — HISTORY OF PRESENT ILLNESS
[FreeTextEntry1] : 68 year old female seen for hearing aid evaluation. Patient has never worn hearing aids before. She presents with mild to mod-severe SNHL, 250-8000 Hz, AU. 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 She occasionally goes out to dinner. [FreeTextEntry8] : Patient seen today for hearing aid dispensing.

## 2025-01-15 NOTE — ASSESSMENT
[FreeTextEntry1] : Aided Ears: Binaural Hearing Aid: O[employer]on Intent 1s Right Serial Number: [id number] Left Serial Number: [id number] Receivers: Right: 1R85 Left 1L85 Molds: AD: [id number] AS [id number] below note : SN [id number] (repair warranty only: 2/2/2028) Accessories: Smart  SN [id number] (repair warranty only: 2/2/2028) Repair warranty: 2/2/2028 Loss and Damage Warranty: 2/2/2028  45 day trial: 3/1/2025  Connected to software and programmed hearing aid. Patient set to ACC 2. Patient reported the volume to be comfortable and sound to be clear.  Molds received are corda molds however, they fit in her ears well with no complaints of sound quality.  Called [employer] because this is not what I ordered.  However, order form for molds was filled out on pediatric order in error for micromolds under corda tube.  ERROR  should've been micromolds for intent aids.  Will call [facility] and [facility]ft[facility] to discuss either rfc or obtain micromolds at [place]. These molds are under remake until 4/21/25 should the patient keep them.   Reviewed usage of hearing aid and chargers as well as maintenance and care of the hearing aid. Practiced proper insertion and removal techniques with patient. Patient demonstrated that she was able to properly insert and remove hearing aid, independently. Paired hearing aid to patient's Android phone. Downloaded the [employer]  live and reviewed usage. Reviewed warranties and 45-day trial period.   Patient is interested in connect clip to use with laptop and as a remote bill. Will demo connect clip at upcoming [facility] appt.     Patient happy with today's services.

## 2025-05-08 NOTE — PHYSICAL EXAM
[de-identified] : well healed scar [Midline] : located in midline position [Normal] : orientation to person, place, and time: normal [de-identified] : Neg Chvosteks' sign